# Patient Record
Sex: FEMALE | Race: WHITE | NOT HISPANIC OR LATINO
[De-identification: names, ages, dates, MRNs, and addresses within clinical notes are randomized per-mention and may not be internally consistent; named-entity substitution may affect disease eponyms.]

---

## 2017-07-13 PROBLEM — Z00.00 ENCOUNTER FOR PREVENTIVE HEALTH EXAMINATION: Status: ACTIVE | Noted: 2017-07-13

## 2017-07-18 ENCOUNTER — APPOINTMENT (OUTPATIENT)
Dept: DERMATOLOGY | Facility: CLINIC | Age: 42
End: 2017-07-18

## 2017-07-18 VITALS — BODY MASS INDEX: 22.08 KG/M2 | HEIGHT: 62 IN | WEIGHT: 120 LBS

## 2017-07-18 DIAGNOSIS — B07.9 VIRAL WART, UNSPECIFIED: ICD-10-CM

## 2017-08-15 ENCOUNTER — APPOINTMENT (OUTPATIENT)
Dept: DERMATOLOGY | Facility: CLINIC | Age: 42
End: 2017-08-15

## 2022-06-20 ENCOUNTER — APPOINTMENT (OUTPATIENT)
Dept: ORTHOPEDIC SURGERY | Facility: CLINIC | Age: 47
End: 2022-06-20
Payer: COMMERCIAL

## 2022-06-20 ENCOUNTER — NON-APPOINTMENT (OUTPATIENT)
Age: 47
End: 2022-06-20

## 2022-06-20 PROCEDURE — 99204 OFFICE O/P NEW MOD 45 MIN: CPT

## 2022-06-20 PROCEDURE — 73630 X-RAY EXAM OF FOOT: CPT | Mod: 50

## 2022-06-20 NOTE — DISCUSSION/SUMMARY
[de-identified] : Today I had a lengthy discussion with the patient regarding their bilateral foot pain. I have addressed all the patient's concerns surrounding the pathology of their condition. XR imaging was completed in office today and results were reviewed with the patient. At this time I would like to obtain bloodwork for further assessment of the patient's condition and to assess for rheumatalgic factors. Bloodwork was ordered for the patient in the office today. We discussed follow-up once the results are obtained for further discussion. The patient understood and verbally agreed to the treatment plan. All of their questions were answered and they were satisfied with the visit. The patient should call the office if they have any questions or experience worsening symptoms.

## 2022-06-20 NOTE — ADDENDUM
[FreeTextEntry1] : I, Darlin Maria, acted solely as a scribe for Dr. Mickey Zuñiga on this date 06/20/2022.\par \par All medical record entries made by the Scribe were at my, Dr. Mickey Zuñiga, direction and personally dictated by me on 06/20/2022 . I have reviewed the chart and agree that the record accurately reflects my personal performance of the history, physical exam, assessment and plan. I have also personally directed, reviewed, and agreed with the chart.	\par

## 2022-06-20 NOTE — HISTORY OF PRESENT ILLNESS
[FreeTextEntry1] : The patient is a 46-year-old female who presents with acute on chronic bilateral foot pain right greater than left.  She was being seen by a podiatrist, diagnosed with plantar fasciitis.  She was made custom orthotics but they increased her foot pains.  Over the past 6 weeks, the patient has performed a home exercise/physical therapy program which consisted of strengthening and stretching, as well as of oral anti-inflammatory use and Tylenol without relief. The patient is here today after failing conservative management. She presents wearing crocs in office.  She is weightbearing as tolerated.  Pain in the midfoot and arches of both feet.  No other complaints.

## 2022-06-20 NOTE — PHYSICAL EXAM
[de-identified] : Bilateral foot Physical Examination:\par \par General: Alert and oriented x3.  In no acute distress.  Pleasant in nature with a normal affect.  No apparent respiratory distress. \par Erythema, Warmth, Rubor: Negative\par Swelling: Negative\par \par ROM Ankle:\par 1. Dorsiflexion: 10 degrees\par 2. Plantarflexion: 40 degrees\par 3. Inversion: 20 degrees\par 4. Eversion: 10 degrees\par \par ROM of digits: Normal\par \par Pes Planus: Negative\par Pes Cavus: Negative\par \par Bunion: Negative\par Janel's Bunion (Bunionette): Negative\par Hammer Toe Deformity/Deformities: Negative\par \par Tenderness to Palpation: \par 1. Heel Pain: Negative\par 2. Midfoot Pain: Positive bilaterally\par 3. First MTP Joint: Negative\par 4. Lis Franc Joint: Negative\par \par Tenderness Metatarsals:\par 1st MT: Negative\par 2nd MT: Negative\par 3rd MT: Negative\par 4th MT: Negative\par 5th MT: Negative\par Base of the 5th MT: Negative\par \par Ligament Pain:\par 1. Lis Franc Ligament: Negative\par 2. Plantar Fascia Ligament: Positive bilaterally\par \par Strength: \par 5/5 TA/GS/EHL/FHL/EDL/ADD/ABD\par \par Pulses: 2+ DP/PT Pulses\par \par Capillary Refill Toes: <2 seconds\par \par Neuro: Intact motor and sensory throughout\par \par Additional Test:\par 1. Hough's Squeeze Test: Negative\par 2. Calcaneal Squeeze Test: Negative [de-identified] : Bilateral foot x-rays reviewed, 2022: No abnormality seen in the midfoot.  Normal foot x-rays.\par \par \par Office Location: [address] [place] [address] [place], [address]\par Office Phone: [phone]\par Office Fax: [phone]\par PATIENT NAME: Carmen Robledo\par PATIENT PHONE NUMBER:\par PATIENT ID: [id number]\par : 1975\par DATE OF EXAM: 2022\par R. Phys. Name: Carol Nolan\par R. Phys. Address: [address] [place] [address], [place] [address]\par R. Phys. Phone: [phone]\par EXAM: MRI-RIGHT ANKLE NON CONTRAST\par \par HISTORY: M71.571 Oth bursitis, not elsewhere classified, right ankle and foot\par \par \par TECHNIQUE: Axial, coronal, and sagittal multigated images of the ankle were\par obtained on a 3.0 Silvia magnet.\par \par COMPARISON: None.\par \par FINDINGS:\par SOFT TISSUES: Unremarkable.\par \par BONE MARROW: No fracture or avascular necrosis.\par \par osteochondral lesion: None.\par \par MEDIAL TENDONS: Unremarkable.\par ANTERIOR TENDONS: Unremarkable.\par LATERAL TENDONS: Unremarkable.\par \par LATERAL LIGAMENTS:\par SYNDESMOTIC LIGAMENTS: Unremarkable anterior and posterior tibiofibular ligament\par as well as the interosseous ligament. The intermalleolar ligament and\par transverse ligament are unremarkable.\par ANTERIOR TALOFIBULAR LIGAMENT: Unremarkable.\par CALCANEAL FIBULAR LIGAMENT: Unremarkable.\par POSTERIOR TALOFIBULAR LIGAMENT: Unremarkable.\par \par MEDIAL ligaments:\par DELTOID LIGAMENT: Unremarkable.\par TIBIO-SPRING LIGAMENT: Unremarkable.\par PROPER SPRING LIGAMENT: Unremarkable.\par \par LISFRANC ligament and joint: Normal.\par \par ACHILLES TENDON: Normal.\par PLANTAR aponeurosis: Unremarkable.\par \par SINUS Tarsi: Normal fat signal characteristics.\par TARSAL tunnel: Normal without masses.\par \par MUSCLES: Normal signal and bulk without edema or atrophy.\par \par IMPRESSION:\par \par \par Symmetric joint spaces.\par \par Intact plantar plates.\par \par The plantar aponeurosis appears normal.\par \par Normal muscle signal and bulk without atrophy or edema.\par \par The talar dome is partially imaged but appears normal.\par \par The flexor and extensor tendons and tendon sheaths are normal.\par \par Signed by: Fabián Mckinnon MD\par Signed Date: 2022 5:23 PM EDT\par \par SIGNED BY: Fabián Mckinnon M.D. Ext 2250 2022 05:23 PM\par \par \par \par Office Location: [address] [place] [address]\par Office Phone: [phone]\par Office Fax: [phone]\par PATIENT NAME: Carmen Robledo\par PATIENT PHONE NUMBER:\par PATIENT ID: [id number]\par : 1975\par DATE OF EXAM: 2022\par R. Phys. Name: D&#039;Carol Cavazos\par R. Phys. Address: [address] [place] [address]\par R. Phys. Phone: [phone]\par RIGHT PLANTAR FASCIA ULTRASOUND\par \par HISTORY: Right heel pain.\par \par TECHNIQUE: Grayscale and color Doppler sonographic evaluation of the right\par plantar fascia was performed.\par \par COMPARISON: No prior studies are available for comparison.\par \par FINDINGS: The plantar fascia measures 0.3 cm, within normal limits. No\par corresponding hyperemia is present. No associated tear is appreciated. No\par contour deforming masses are appreciated. The overlying subcutaneous tissues are\par thickened.\par \par IMPRESSION:\par \par \par Normal sonographic evaluation of the right plantar fascia and heel.\par \par Signed by: Obey Barfield\par Signed Date: 2022 6:28 PM EDT\par \par SIGNED BY: Obey Barfield MD, Ext. 2250 2022 06:28 PM

## 2022-06-27 LAB
24R-OH-CALCIDIOL SERPL-MCNC: 82 PG/ML
25(OH)D3 SERPL-MCNC: 18.8 NG/ML
ALBUMIN SERPL ELPH-MCNC: 4.9 G/DL
ALP BLD-CCNC: 84 U/L
ALT SERPL-CCNC: 19 U/L
ANA PAT FLD IF-IMP: ABNORMAL
ANA SER IF-ACNC: ABNORMAL
ANION GAP SERPL CALC-SCNC: 13 MMOL/L
APTT BLD: 28.4 SEC
AST SERPL-CCNC: 20 U/L
B BURGDOR AB SER-IMP: NEGATIVE
B BURGDOR IGG+IGM SER QL: 0.18 INDEX
BASOPHILS # BLD AUTO: 0.05 K/UL
BASOPHILS NFR BLD AUTO: 0.7 %
BILIRUB SERPL-MCNC: 0.8 MG/DL
BUN SERPL-MCNC: 10 MG/DL
CALCIUM SERPL-MCNC: 10.1 MG/DL
CHLORIDE SERPL-SCNC: 101 MMOL/L
CO2 SERPL-SCNC: 24 MMOL/L
CREAT SERPL-MCNC: 0.67 MG/DL
CRP SERPL-MCNC: <3 MG/L
EGFR: 108 ML/MIN/1.73M2
EOSINOPHIL # BLD AUTO: 0.02 K/UL
EOSINOPHIL NFR BLD AUTO: 0.3 %
ERYTHROCYTE [SEDIMENTATION RATE] IN BLOOD BY WESTERGREN METHOD: 8 MM/HR
GLUCOSE SERPL-MCNC: 86 MG/DL
HCT VFR BLD CALC: 44.2 %
HGB BLD-MCNC: 15 G/DL
IMM GRANULOCYTES NFR BLD AUTO: 0.3 %
INR PPP: 1.01 RATIO
LYMPHOCYTES # BLD AUTO: 2.53 K/UL
LYMPHOCYTES NFR BLD AUTO: 34 %
MAN DIFF?: NORMAL
MCHC RBC-ENTMCNC: 31.6 PG
MCHC RBC-ENTMCNC: 33.9 GM/DL
MCV RBC AUTO: 93.2 FL
MONOCYTES # BLD AUTO: 0.46 K/UL
MONOCYTES NFR BLD AUTO: 6.2 %
NEUTROPHILS # BLD AUTO: 4.37 K/UL
NEUTROPHILS NFR BLD AUTO: 58.5 %
PLATELET # BLD AUTO: 212 K/UL
POTASSIUM SERPL-SCNC: 4.4 MMOL/L
PROT SERPL-MCNC: 7.5 G/DL
PT BLD: 11.7 SEC
RBC # BLD: 4.74 M/UL
RBC # FLD: 12 %
RHEUMATOID FACT SER QL: <10 IU/ML
SODIUM SERPL-SCNC: 138 MMOL/L
TSH SERPL-ACNC: 0.91 UIU/ML
URATE SERPL-MCNC: 4.2 MG/DL
WBC # FLD AUTO: 7.45 K/UL

## 2022-06-30 LAB — HLA-B27 RELATED AG QL: NEGATIVE

## 2022-08-09 ENCOUNTER — APPOINTMENT (OUTPATIENT)
Dept: RHEUMATOLOGY | Facility: CLINIC | Age: 47
End: 2022-08-09

## 2022-09-26 ENCOUNTER — APPOINTMENT (OUTPATIENT)
Dept: ORTHOPEDIC SURGERY | Facility: CLINIC | Age: 47
End: 2022-09-26

## 2022-09-26 DIAGNOSIS — M25.50 PAIN IN UNSPECIFIED JOINT: ICD-10-CM

## 2022-09-26 DIAGNOSIS — M79.671 PAIN IN RIGHT FOOT: ICD-10-CM

## 2022-09-26 DIAGNOSIS — M79.672 PAIN IN LEFT FOOT: ICD-10-CM

## 2022-09-26 PROCEDURE — 99214 OFFICE O/P EST MOD 30 MIN: CPT

## 2022-09-26 NOTE — ADDENDUM
[FreeTextEntry1] : I, Darlin Maria, acted solely as a scribe for Dr. Mickey Zuñiga on this date 09/26/2022.\par \par All medical record entries made by the Scribe were at my, Dr. Mickey Zuñiga, direction and personally dictated by me on 09/26/2022. I have reviewed the chart and agree that the record accurately reflects my personal performance of the history, physical exam, assessment and plan. I have also personally directed, reviewed, and agreed with the chart.	\par

## 2022-09-26 NOTE — DISCUSSION/SUMMARY
[de-identified] : Today I had a lengthy discussion with the patient regarding their bilateral foot pain. I have addressed all the patient's concerns surrounding the pathology of their condition. At this time, I advised to the patient to send the MRI report to the office for further evaluation. We discussed follow-up over the phone once the patient obtains the report for further discussion. I recommend the patient undergo a course of physical therapy 2-3 times a week for a total of 8-12 weeks. A prescription was given for the physical therapy today. The patient understood and verbally agreed to the treatment plan. All of their questions were answered and they were satisfied with the visit. The patient should call the office if they have any questions or experience worsening symptoms.\par \par Heavyness\par Gait abnormalities\par MRI imaging to be reviewed with report

## 2022-09-26 NOTE — PHYSICAL EXAM
[de-identified] : Bilateral foot Physical Examination:\par \par General: Alert and oriented x3.  In no acute distress.  Pleasant in nature with a normal affect.  No apparent respiratory distress. \par Erythema, Warmth, Rubor: Negative\par Swelling: Negative\par \par ROM Ankle:\par 1. Dorsiflexion: 10 degrees\par 2. Plantarflexion: 40 degrees\par 3. Inversion: 20 degrees\par 4. Eversion: 10 degrees\par \par ROM of digits: Normal\par \par Pes Planus: Negative\par Pes Cavus: Negative\par \par Bunion: Negative\par Janel's Bunion (Bunionette): Negative\par Hammer Toe Deformity/Deformities: Negative\par \par Tenderness to Palpation: \par 1. Heel Pain: Negative\par 2. Midfoot Pain: Positive bilaterally\par 3. First MTP Joint: Negative\par 4. Lis Franc Joint: Negative\par \par Tenderness Metatarsals:\par 1st MT: Negative\par 2nd MT: Negative\par 3rd MT: Negative\par 4th MT: Negative\par 5th MT: Negative\par Base of the 5th MT: Negative\par \par Ligament Pain:\par 1. Lis Franc Ligament: Negative\par 2. Plantar Fascia Ligament: Positive bilaterally\par \par Strength: \par 5/5 TA/GS/EHL/FHL/EDL/ADD/ABD\par \par Pulses: 2+ DP/PT Pulses\par \par Capillary Refill Toes: <2 seconds\par \par Neuro: Intact motor and sensory throughout\par \par Additional Test:\par 1. Hough's Squeeze Test: Negative\par 2. Calcaneal Squeeze Test: Negative [de-identified] : No new imaging was obtained.

## 2022-09-26 NOTE — HISTORY OF PRESENT ILLNESS
[FreeTextEntry1] : 9/26/2022: The patient is a 47 year old female presenting for a follow-up evaluation of bilateral foot pain, right greater than left. The patient reports that her pain scale has remained unchanged. She reports concerns with her gait, reporting that her feet feel heavy with ambulation. She recently obtained bloodwork with her PCP where the patient reports that she had to obtain advanced bloodwork due to elevated rheumatalgic factors. The patient then reports that the advanced bloodwork was negative, however mynor was informed that she has a ganglion cyst. The patient reports that she obtained an MRI/US while in her PCP's office at a [facility]. No other complaints. \par \par 6/20/2022: The patient is a 46-year-old female who presents with acute on chronic bilateral foot pain right greater than left.  She was being seen by a podiatrist, diagnosed with plantar fasciitis.  She was made custom orthotics but they increased her foot pains.  Over the past 6 weeks, the patient has performed a home exercise/physical therapy program which consisted of strengthening and stretching, as well as of oral anti-inflammatory use and Tylenol without relief. The patient is here today after failing conservative management. She presents wearing crocs in office.  She is weightbearing as tolerated.  Pain in the midfoot and arches of both feet.  No other complaints.

## 2023-01-11 ENCOUNTER — NON-APPOINTMENT (OUTPATIENT)
Age: 48
End: 2023-01-11

## 2023-01-11 ENCOUNTER — APPOINTMENT (OUTPATIENT)
Dept: NEUROLOGY | Facility: CLINIC | Age: 48
End: 2023-01-11
Payer: COMMERCIAL

## 2023-01-11 VITALS
SYSTOLIC BLOOD PRESSURE: 140 MMHG | DIASTOLIC BLOOD PRESSURE: 100 MMHG | BODY MASS INDEX: 25.76 KG/M2 | HEIGHT: 62 IN | WEIGHT: 140 LBS

## 2023-01-11 DIAGNOSIS — Z78.9 OTHER SPECIFIED HEALTH STATUS: ICD-10-CM

## 2023-01-11 PROCEDURE — 99204 OFFICE O/P NEW MOD 45 MIN: CPT

## 2023-01-11 NOTE — PHYSICAL EXAM
[General Appearance - Alert] : alert [General Appearance - In No Acute Distress] : in no acute distress [General Appearance - Well Nourished] : well nourished [General Appearance - Well Developed] : well developed [Person] : oriented to person [Place] : oriented to place [Time] : oriented to time [Remote Intact] : remote memory intact [Registration Intact] : recent registration memory intact [Span Intact] : the attention span was normal [Concentration Intact] : normal concentrating ability [Visual Intact] : visual attention was ~T not ~L decreased [Naming Objects] : no difficulty naming common objects [Repeating Phrases] : no difficulty repeating a phrase [Fluency] : fluency intact [Comprehension] : comprehension intact [Current Events] : adequate knowledge of current events [Past History] : adequate knowledge of personal past history [Cranial Nerves Optic (II)] : visual acuity intact bilaterally,  visual fields full to confrontation, pupils equal round and reactive to light [Cranial Nerves Oculomotor (III)] : extraocular motion intact [Cranial Nerves Trigeminal (V)] : facial sensation intact symmetrically [Cranial Nerves Facial (VII)] : face symmetrical [Cranial Nerves Vestibulocochlear (VIII)] : hearing was intact bilaterally [Cranial Nerves Glossopharyngeal (IX)] : tongue and palate midline [Cranial Nerves Accessory (XI - Cranial And Spinal)] : head turning and shoulder shrug symmetric [Cranial Nerves Hypoglossal (XII)] : there was no tongue deviation with protrusion [Motor Tone] : muscle tone was normal in all four extremities [Involuntary Movements] : no involuntary movements were seen [No Muscle Atrophy] : normal bulk in all four extremities [Paresis Pronator Drift Right-Sided] : no pronator drift on the right [Paresis Pronator Drift Left-Sided] : no pronator drift on the left [Motor Strength Upper Extremities Bilaterally] : strength was normal in both upper extremities [Motor Strength Lower Extremities Right] : there was weakness of the right lower extremity [Motor Strength Lower Extremities Left] : strength was normal in the left lower extremity [Sensation Tactile Decrease] : light touch was intact [Sensation Pain / Temperature Decrease] : pain and temperature was intact [Sensation Vibration Decrease] : vibration was intact [Proprioception] : proprioception was intact [Abnormal Walk] : normal gait [Balance] : balance was intact [Tremor] : no tremor present [Coordination - Dysmetria Impaired Finger-to-Nose Bilateral] : not present [3+] : Ankle jerk left 3+ [FreeTextEntry6] : Mild weakness at the right foot, especially with dorsiflexion [Sclera] : the sclera and conjunctiva were normal [PERRL With Normal Accommodation] : pupils were equal in size, round, reactive to light, with normal accommodation [Extraocular Movements] : extraocular movements were intact [No APD] : no afferent pupillary defect [No MCKAYLA] : no internuclear ophthalmoplegia [Full Visual Field] : full visual field

## 2023-01-11 NOTE — ASSESSMENT
[FreeTextEntry1] : This is a 47-year-old woman with lower extremity weakness varying over the past 2 years.  She has MRI of her brain and cervical thoracic spines with several demyelinating lesions.  Based on the fact that these lesions are both supra and infratentorial as well as on the spine and she has had neurologic symptoms this seems to represent possible multiple sclerosis.  She did have COVID but her symptoms predated COVID so I do not think this is a demyelinating reaction to that virus.  At this time we discussed different treatment options 1 of which would be follow-up with no medicine the second would be a second opinion at multiple sclerosis specialist in the last would be treatment.  She opted for the treatment option as she agrees that this may represent a multiple sclerosis and wants to be treated as soon as possible.  I would prescribe a medication as opposed the and will attempt to get authorization for it.  We discussed as a Posey as well as other treatment options and this is the one we agreed on.  We will check a CBC complete metabolic profile including hepatic function as well as a VZV antibody prior to initiating the medication.  We will have an eye check for macular degeneration as well as an EKG as well done prior to starting.  I will be in contact with her as needed until her follow-up appointment and she will return to the office in about 3 months.

## 2023-01-11 NOTE — DATA REVIEWED
[de-identified] : She had an MRI of the brain with and without contrast done on December 30, 2022.  I was able to review images on a CD.  There was scattered FLAIR hyperintensities some consistent with demyelinating type lesions.  There was no abnormal uptake with contrast.  There was no acute stroke mass or bleed noted.\par \par He had a noncontrast cervical spine MRI on December 20, 2022 which I had a chance to review images.  There was scattered hyperintensities on STIR imaging.  She had a noncontrast thoracic spine MRI the following day which I had a chance to review and again there was some patchy hyperintensity on STIR imaging in the mid thoracic cord.

## 2023-01-11 NOTE — HISTORY OF PRESENT ILLNESS
[FreeTextEntry1] : Initial office visit January 11, 2023:\par This is a 47-year-old woman who presents today with abnormal MRI concerning for demyelinating disease.  For about 2 years she has been having no problems with her feet.  They just are not moving right she is having some difficulty walking.  She has some difficulty driving because of this.  She initially went to a podiatrist was diagnosed with plantar fasciitis and sent for physical therapy which did not help much.  Eventually she thought it might be her back and went to an orthopedist.  The orthopedist ordered lumbar spine MRI as well as a cervical spine MRI as her reflexes were brisk.  Cervical spine and thoracic spine MRIs were concerning for demyelinating lesions and the orthopedist ordered a brain MRI and referred her to neurology.  Upon review of her history she does not have any clear pre-existing flares but states that maybe she just did not pay attention to them when she was younger.  She had her parents with her today who did not think she had any flareups to their knowledge.  She had no episodes consistent with optic neuritis.  She has been having on and off symptoms for the past 2 years some of which may suggest a relapsing remitting course.  She is here today for neurologic evaluation.

## 2023-01-11 NOTE — CONSULT LETTER
[Dear  ___] : Dear  [unfilled], [Consult Letter:] : I had the pleasure of evaluating your patient, [unfilled]. [Please see my note below.] : Please see my note below. [Consult Closing:] : Thank you very much for allowing me to participate in the care of this patient.  If you have any questions, please do not hesitate to contact me. [Sincerely,] : Sincerely, [FreeTextEntry3] : Sonny Felder M.D., Ph.D. DPN-N\par [facility]\par Neurology at [place]\par Medical Director of Stroke Services\par [facility]\par

## 2023-03-01 ENCOUNTER — APPOINTMENT (OUTPATIENT)
Dept: NEUROLOGY | Facility: CLINIC | Age: 48
End: 2023-03-01

## 2023-05-04 ENCOUNTER — APPOINTMENT (OUTPATIENT)
Dept: NEUROLOGY | Facility: CLINIC | Age: 48
End: 2023-05-04
Payer: COMMERCIAL

## 2023-05-04 VITALS
BODY MASS INDEX: 25.76 KG/M2 | HEIGHT: 62 IN | WEIGHT: 140 LBS | DIASTOLIC BLOOD PRESSURE: 90 MMHG | SYSTOLIC BLOOD PRESSURE: 130 MMHG

## 2023-05-04 DIAGNOSIS — M21.379 FOOT DROP, UNSPECIFIED FOOT: ICD-10-CM

## 2023-05-04 DIAGNOSIS — G35 MULTIPLE SCLEROSIS: ICD-10-CM

## 2023-05-04 PROCEDURE — 99214 OFFICE O/P EST MOD 30 MIN: CPT

## 2023-05-04 RX ORDER — OZANIMOD HYDROCHLORIDE 0.23-0.46
KIT ORAL
Qty: 1 | Refills: 0 | Status: COMPLETED | COMMUNITY
Start: 2023-01-11 | End: 2023-05-04

## 2023-05-04 RX ORDER — OZANIMOD HYDROCHLORIDE 0.92 MG/1
CAPSULE ORAL
Qty: 90 | Refills: 3 | Status: ACTIVE | COMMUNITY
Start: 2023-01-11

## 2023-05-04 NOTE — PHYSICAL EXAM
[Person] : oriented to person [Place] : oriented to place [Time] : oriented to time [Remote Intact] : remote memory intact [Registration Intact] : recent registration memory intact [Span Intact] : the attention span was normal [Concentration Intact] : normal concentrating ability [Visual Intact] : visual attention was ~T not ~L decreased [Naming Objects] : no difficulty naming common objects [Repeating Phrases] : no difficulty repeating a phrase [Fluency] : fluency intact [Comprehension] : comprehension intact [Current Events] : adequate knowledge of current events [Past History] : adequate knowledge of personal past history [Cranial Nerves Optic (II)] : visual acuity intact bilaterally,  visual fields full to confrontation, pupils equal round and reactive to light [Cranial Nerves Oculomotor (III)] : extraocular motion intact [Cranial Nerves Trigeminal (V)] : facial sensation intact symmetrically [Cranial Nerves Facial (VII)] : face symmetrical [Cranial Nerves Vestibulocochlear (VIII)] : hearing was intact bilaterally [Cranial Nerves Glossopharyngeal (IX)] : tongue and palate midline [Cranial Nerves Accessory (XI - Cranial And Spinal)] : head turning and shoulder shrug symmetric [Cranial Nerves Hypoglossal (XII)] : there was no tongue deviation with protrusion [Motor Tone] : muscle tone was normal in all four extremities [Involuntary Movements] : no involuntary movements were seen [No Muscle Atrophy] : normal bulk in all four extremities [Paresis Pronator Drift Right-Sided] : no pronator drift on the right [Paresis Pronator Drift Left-Sided] : no pronator drift on the left [Motor Strength Upper Extremities Bilaterally] : strength was normal in both upper extremities [Motor Strength Lower Extremities Right] : there was weakness of the right lower extremity [Motor Strength Lower Extremities Left] : strength was normal in the left lower extremity [Sensation Tactile Decrease] : light touch was intact [Sensation Pain / Temperature Decrease] : pain and temperature was intact [Sensation Vibration Decrease] : vibration was intact [Proprioception] : proprioception was intact [Balance] : balance was intact [Tremor] : no tremor present [Coordination - Dysmetria Impaired Finger-to-Nose Bilateral] : not present [3+] : Patella left 3+ [FreeTextEntry6] : weakness at the right foot, especially with dorsiflexion [FreeTextEntry8] : Right steppage gait [Sclera] : the sclera and conjunctiva were normal [PERRL With Normal Accommodation] : pupils were equal in size, round, reactive to light, with normal accommodation [Extraocular Movements] : extraocular movements were intact [No APD] : no afferent pupillary defect [No MCKAYLA] : no internuclear ophthalmoplegia [Full Visual Field] : full visual field

## 2023-05-04 NOTE — CONSULT LETTER
[Dear  ___] : Dear  [unfilled], [Courtesy Letter:] : I had the pleasure of seeing your patient, [unfilled], in my office today. [Please see my note below.] : Please see my note below. [Consult Closing:] : Thank you very much for allowing me to participate in the care of this patient.  If you have any questions, please do not hesitate to contact me. [Sincerely,] : Sincerely, [FreeTextEntry3] : Sonny Felder M.D., Ph.D. DPN-N\par [facility]\par Neurology at [place]\par Medical Director of Stroke Services\par [facility]\par

## 2023-05-04 NOTE — HISTORY OF PRESENT ILLNESS
[FreeTextEntry1] : Initial office visit January 11, 2023:\par This is a 47-year-old woman who presents today with abnormal MRI concerning for demyelinating disease.  For about 2 years she has been having no problems with her feet.  They just are not moving right she is having some difficulty walking.  She has some difficulty driving because of this.  She initially went to a podiatrist was diagnosed with plantar fasciitis and sent for physical therapy which did not help much.  Eventually she thought it might be her back and went to an orthopedist.  The orthopedist ordered lumbar spine MRI as well as a cervical spine MRI as her reflexes were brisk.  Cervical spine and thoracic spine MRIs were concerning for demyelinating lesions and the orthopedist ordered a brain MRI and referred her to neurology.  Upon review of her history she does not have any clear pre-existing flares but states that maybe she just did not pay attention to them when she was younger.  She had her parents with her today who did not think she had any flareups to their knowledge.  She had no episodes consistent with optic neuritis.  She has been having on and off symptoms for the past 2 years some of which may suggest a relapsing remitting course.  She is here today for neurologic evaluation.\par \par Follow-up May 4, 2023:\par This is a 47-year-old woman who presents today with multiple sclerosis.  She is currently having continued issues with her feet.  She is having trouble walking due to these issues.  She cannot walk great distances because of it.  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  She is currently taking supposing without side effects.  She has had no new symptoms.  She is here today for neurologic follow-up.

## 2023-05-04 NOTE — ASSESSMENT
[FreeTextEntry1] : This is a 47-year-old woman with relapsing remitting multiple sclerosis stable and supposed to.  We will continue this.  I will continue physical therapy for her right foot drop.  I will send her for blood work because of the as opposed especially her white blood cell count to make sure she is not lymphopenic.  I will call her with these results and see her back in 6 months, sooner should the need arise.

## 2023-11-06 ENCOUNTER — APPOINTMENT (OUTPATIENT)
Dept: NEUROLOGY | Facility: CLINIC | Age: 48
End: 2023-11-06